# Patient Record
Sex: FEMALE | Race: BLACK OR AFRICAN AMERICAN
[De-identification: names, ages, dates, MRNs, and addresses within clinical notes are randomized per-mention and may not be internally consistent; named-entity substitution may affect disease eponyms.]

---

## 2018-02-06 ENCOUNTER — APPOINTMENT (OUTPATIENT)
Dept: CT IMAGING | Facility: CLINIC | Age: 53
End: 2018-02-06

## 2018-02-06 ENCOUNTER — OUTPATIENT (OUTPATIENT)
Dept: OUTPATIENT SERVICES | Facility: HOSPITAL | Age: 53
LOS: 1 days | End: 2018-02-06
Payer: COMMERCIAL

## 2018-02-06 ENCOUNTER — TRANSCRIPTION ENCOUNTER (OUTPATIENT)
Age: 53
End: 2018-02-06

## 2018-02-06 DIAGNOSIS — Z00.8 ENCOUNTER FOR OTHER GENERAL EXAMINATION: ICD-10-CM

## 2018-02-06 DIAGNOSIS — E89.0 POSTPROCEDURAL HYPOTHYROIDISM: Chronic | ICD-10-CM

## 2018-02-06 PROCEDURE — 82565 ASSAY OF CREATININE: CPT

## 2018-02-06 PROCEDURE — 74177 CT ABD & PELVIS W/CONTRAST: CPT

## 2018-02-06 PROCEDURE — 74177 CT ABD & PELVIS W/CONTRAST: CPT | Mod: 26

## 2020-02-14 ENCOUNTER — EMERGENCY (EMERGENCY)
Facility: HOSPITAL | Age: 55
LOS: 1 days | Discharge: HOME | End: 2020-02-14
Admitting: EMERGENCY MEDICINE
Payer: COMMERCIAL

## 2020-02-14 VITALS
HEART RATE: 82 BPM | DIASTOLIC BLOOD PRESSURE: 87 MMHG | OXYGEN SATURATION: 98 % | TEMPERATURE: 98 F | SYSTOLIC BLOOD PRESSURE: 142 MMHG | RESPIRATION RATE: 20 BRPM

## 2020-02-14 DIAGNOSIS — E89.0 POSTPROCEDURAL HYPOTHYROIDISM: Chronic | ICD-10-CM

## 2020-02-14 PROCEDURE — 99284 EMERGENCY DEPT VISIT MOD MDM: CPT

## 2020-02-14 RX ORDER — OXYCODONE AND ACETAMINOPHEN 5; 325 MG/1; MG/1
1 TABLET ORAL ONCE
Refills: 0 | Status: DISCONTINUED | OUTPATIENT
Start: 2020-02-14 | End: 2020-02-14

## 2020-02-14 RX ORDER — KETOROLAC TROMETHAMINE 30 MG/ML
30 SYRINGE (ML) INJECTION ONCE
Refills: 0 | Status: DISCONTINUED | OUTPATIENT
Start: 2020-02-14 | End: 2020-02-14

## 2020-02-14 RX ORDER — LIDOCAINE 4 G/100G
2 CREAM TOPICAL ONCE
Refills: 0 | Status: COMPLETED | OUTPATIENT
Start: 2020-02-14 | End: 2020-02-14

## 2020-02-14 RX ORDER — DIAZEPAM 5 MG
5 TABLET ORAL ONCE
Refills: 0 | Status: DISCONTINUED | OUTPATIENT
Start: 2020-02-14 | End: 2020-02-14

## 2020-02-14 RX ADMIN — Medication 30 MILLIGRAM(S): at 23:13

## 2020-02-14 RX ADMIN — Medication 30 MILLIGRAM(S): at 23:56

## 2020-02-14 RX ADMIN — Medication 5 MILLIGRAM(S): at 23:52

## 2020-02-14 RX ADMIN — OXYCODONE AND ACETAMINOPHEN 1 TABLET(S): 5; 325 TABLET ORAL at 23:56

## 2020-02-14 RX ADMIN — OXYCODONE AND ACETAMINOPHEN 1 TABLET(S): 5; 325 TABLET ORAL at 23:13

## 2020-02-14 RX ADMIN — LIDOCAINE 2 PATCH: 4 CREAM TOPICAL at 23:52

## 2020-02-14 NOTE — ED PROVIDER NOTE - X-RAY INTERPRETATION
LANCE Live-- L-spine xray images reviewed by me; straightening of lumbar lordosis noted, no acute pathology noted.

## 2020-02-14 NOTE — ED PROVIDER NOTE - PMH
Atrial fibrillation  brief episode that converted to NSR, approx 2003  Diverticulosis of large intestine without hemorrhage    Goiter    HTN (hypertension)    Hypothyroidism (acquired)    Intramural leiomyoma of uterus

## 2020-02-14 NOTE — ED PROVIDER NOTE - NSFOLLOWUPINSTRUCTIONS_ED_ALL_ED_FT
Follow up with a primary medical doctor within 2-3 days of ER discharge.  If you need to find a doctor to follow up with, you may call the [facility] Patient Access Services helpline at [phone] to find names/contact #s for a practitioner (or specialist) to follow up with.  Bring your discharge papers / test results with you to your follow up appointment(s).  Rest / no strenuous activity.  Stay well hydrated.  MEDICATIONS:  See attached medication sheet(s).    Continue ibuprofen (take with food) to help with inflammation.    You can also use Lidocaine topical pain relief patches (over the counter) as per package directions, as needed.  To follow up on any pending results (the official radiology report of your Lumbar spine x-rays), you may call the main ED # [phone] from 9am - 2pm; please ask to speak to the ED Administrative PA.  ***Return to the Emergency Department if you experience any new / worsening symptoms or have any problems / concerns.***

## 2020-02-14 NOTE — ED ADULT TRIAGE NOTE - CHIEF COMPLAINT QUOTE
"I have back pain x 2 weeks. 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 I went to urgent care on tuesday. they gave me some muscle relaxtant it didn't work. still have the pain" Pain to entire back radiating down to b/l legs

## 2020-02-14 NOTE — ED PROVIDER NOTE - OBJECTIVE STATEMENT
55 yo F with PMHX of HTN and Hypothyroid here with progressively worsening b/l low back pain, radiating down posterior legs x 2 weeks. Pt notes that she attempted to pick/ lift someone off the floor and pain developed. Pt went to  on Tuesday x 4 days ago, was given ibuprofen 600mg taking it every 8 hours and cyclobenzaprine, with no relief. Denies fevers, chills, nausea, vomiting, abdominal pain, chest pain, shortness of breath, dizziness, headache, dysuria, hematuria, urinary/ bowel incontinence, saddle anesthesia, weakness, numbness, tingling.

## 2020-02-14 NOTE — ED ADULT NURSE NOTE - OBJECTIVE STATEMENT
Pt received in intake rm #8, 54Y F Aox4, ambulatory. Pt arrives to ED c/o lower back pain. Pt describes pain as generalized radiating to b/l legs. 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 Upon assessment pt respirations even and unlabored b/l. Pt appears in NAD, medicated for pain as ordered, VS as charted, family at bedside will continue to monitor.

## 2020-02-14 NOTE — ED PROVIDER NOTE - CLINICAL SUMMARY MEDICAL DECISION MAKING FREE TEXT BOX
55 yo F with Back pain after lifting heavy patient from floor, no red flag symptoms, neuro/ motor intact - pain control, valium, reassess  considet medrol dose pack for dc.

## 2020-02-14 NOTE — ED PROVIDER NOTE - PATIENT PORTAL LINK FT
You can access the FollowMyHealth Patient Portal offered by [facility] by registering at the following website: [email or web address]. By joining [email or web address]’s FollowMyHealth portal, you will also be able to view your health information using other applications (apps) compatible with our system.

## 2020-02-14 NOTE — ED PROVIDER NOTE - PROGRESS NOTE DETAILS
OSVALDO Mcginnis: pt offered CDU for pain control and MRI, pt would like to be dced home. and follow up as outpatient. pt pending xray. pain meds rxed to pharmacy. Signed out to OSVALDO Live to followup xray result. pt has improved and is ambulating. OSVALDO Live---- Xray images reviewed (see above Results notes); pt reassessed (in the interim, pt objectively noted to be resting comfortably; no issues thus far) - pt verbalizes feeling improved, states has been able to ambulate without difficulty.  X-ray images discussed; prelim radiology report discussed with pt.  Pt given option for CDU stay for pain control / MRI, but pt states she prefers to go home and will f/u outpatient.  Pt will be d/c'd home per below instructions as per earlier preliminary dispo discussion with OSVALDO Mcginnis.

## 2020-02-15 VITALS
SYSTOLIC BLOOD PRESSURE: 125 MMHG | RESPIRATION RATE: 16 BRPM | DIASTOLIC BLOOD PRESSURE: 75 MMHG | HEART RATE: 59 BPM | TEMPERATURE: 98 F | OXYGEN SATURATION: 98 %

## 2020-02-15 PROCEDURE — 72100 X-RAY EXAM L-S SPINE 2/3 VWS: CPT | Mod: 26

## 2020-02-15 RX ORDER — DIAZEPAM 5 MG
1 TABLET ORAL
Qty: 9 | Refills: 0
Start: 2020-02-15 | End: 2020-02-17

## 2020-04-28 ENCOUNTER — TRANSCRIPTION ENCOUNTER (OUTPATIENT)
Age: 55
End: 2020-04-28

## 2020-05-28 ENCOUNTER — APPOINTMENT (OUTPATIENT)
Dept: INTERNAL MEDICINE | Facility: CLINIC | Age: 55
End: 2020-05-28

## 2020-08-18 ENCOUNTER — APPOINTMENT (OUTPATIENT)
Dept: CT IMAGING | Facility: IMAGING CENTER | Age: 55
End: 2020-08-18
Payer: COMMERCIAL

## 2020-08-18 ENCOUNTER — OUTPATIENT (OUTPATIENT)
Dept: OUTPATIENT SERVICES | Facility: HOSPITAL | Age: 55
LOS: 1 days | End: 2020-08-18
Payer: COMMERCIAL

## 2020-08-18 DIAGNOSIS — E89.0 POSTPROCEDURAL HYPOTHYROIDISM: Chronic | ICD-10-CM

## 2020-08-18 DIAGNOSIS — Z00.8 ENCOUNTER FOR OTHER GENERAL EXAMINATION: ICD-10-CM

## 2020-08-18 PROCEDURE — 82565 ASSAY OF CREATININE: CPT

## 2020-08-18 PROCEDURE — 74177 CT ABD & PELVIS W/CONTRAST: CPT | Mod: 26

## 2020-08-18 PROCEDURE — 74177 CT ABD & PELVIS W/CONTRAST: CPT

## 2021-01-06 ENCOUNTER — APPOINTMENT (OUTPATIENT)
Dept: INTERNAL MEDICINE | Facility: CLINIC | Age: 56
End: 2021-01-06

## 2023-03-13 ENCOUNTER — NON-APPOINTMENT (OUTPATIENT)
Age: 58
End: 2023-03-13

## 2023-03-13 ENCOUNTER — APPOINTMENT (OUTPATIENT)
Dept: OPHTHALMOLOGY | Facility: CLINIC | Age: 58
End: 2023-03-13
Payer: COMMERCIAL

## 2023-03-13 PROCEDURE — 92015 DETERMINE REFRACTIVE STATE: CPT

## 2023-03-13 PROCEDURE — 92004 COMPRE OPH EXAM NEW PT 1/>: CPT

## 2023-03-13 PROCEDURE — 92201 OPSCPY EXTND RTA DRAW UNI/BI: CPT

## 2024-09-26 NOTE — ED ADULT TRIAGE NOTE - STATUS:
This is a chronic condition. Diagnosed 6/2020. 11/30/23 A1c 6.3   She is taking medication, yet she does not remember what she takes  She will bring her meds next time.   Current medications: unknown    Last A1c:  A1c 6.3 (11/30/23) 7.1% (1/2023); 7.5% (9/2022); 6.8% (8/2021); 7.8% (4/2021)  Last Microalb/Cr ratio: 82 (9/2022)  Fasting sugars: n/a  Last diabetic foot exam:   Last retinal eye exam:   ACEi/ARB: olmesartan  Statin: nottsure  Concomitant HTN: stable  Nightly foot checks: yes     Intact

## 2025-03-27 NOTE — ED ADULT TRIAGE NOTE - HEART RATE (BEATS/MIN)
Department of Anesthesiology  Preprocedure Note       Name:  Tash Lynch   Age:  60 y.o.  :  1964                                          MRN:  [id number]         Date:  3/27/2025      Surgeon: Surgeon(s):  Scot Pimentel MD    Procedure: Procedure(s):  COLONOSCOPY    Medications prior to admission:   Prior to Admission medications    Medication Sig Start Date End Date Taking? Authorizing Provider   aspirin 81 MG EC tablet Take 1 tablet by mouth 10/9/24  Yes Hakan Arrieta MD   atorvastatin (LIPITOR) 80 MG tablet Take 1 tablet by mouth daily   Yes Hakan Arrieta MD   amLODIPine (NORVASC) 10 MG tablet Take 1 tablet by mouth 24  Yes Hakan Arrieta MD   benazepril (LOTENSIN) 20 MG tablet Take by mouth   Yes Hakan Arrieta MD   Cholecalciferol 100 MCG (4000 UT) TABS Take 10 mcg by mouth 1/3/25  Yes Hakan Arrieta MD   Metoprolol Succinate 25 MG CS24 Take by mouth   Yes Hakan Arrieta MD   Dulaglutide (TRULICITY SC) Inject into the skin every 7 days  Patient not taking: Reported on 3/27/2025    Hakan Arrieta MD       Current medications:    Current Facility-Administered Medications   Medication Dose Route Frequency Provider Last Rate Last Admin    0.9 % sodium chloride infusion   IntraVENous Continuous Scot Pimentel MD        sodium chloride flush 0.9 % injection 5-40 mL  5-40 mL IntraVENous 2 times per day Scot Pimentel MD        sodium chloride flush 0.9 % injection 5-40 mL  5-40 mL IntraVENous PRN Scot Pimentel MD        0.9 % sodium chloride infusion   IntraVENous PRN Scot Pimentel MD           Allergies:  No Known Allergies    Problem List:  There is no problem list on file for this patient.      Past Medical History:        Diagnosis Date    CAD (coronary artery disease)     Gastrointestinal disorder     Hypertension     Sleep apnea     uses cpap       Past Surgical History:        Procedure Laterality Date    COLONOSCOPY N/A 2025    
82